# Patient Record
Sex: FEMALE | URBAN - METROPOLITAN AREA
[De-identification: names, ages, dates, MRNs, and addresses within clinical notes are randomized per-mention and may not be internally consistent; named-entity substitution may affect disease eponyms.]

---

## 2023-09-22 ENCOUNTER — TELEPHONE ENCOUNTER (OUTPATIENT)
Dept: URBAN - METROPOLITAN AREA CLINIC 6 | Facility: CLINIC | Age: 58
End: 2023-09-22

## 2023-09-22 ENCOUNTER — TELEPHONE ENCOUNTER (OUTPATIENT)
Dept: URBAN - METROPOLITAN AREA CLINIC 98 | Facility: CLINIC | Age: 58
End: 2023-09-22

## 2023-09-28 ENCOUNTER — OFFICE VISIT (OUTPATIENT)
Dept: URBAN - METROPOLITAN AREA CLINIC 98 | Facility: CLINIC | Age: 58
End: 2023-09-28
Payer: OTHER GOVERNMENT

## 2023-09-28 ENCOUNTER — LAB OUTSIDE AN ENCOUNTER (OUTPATIENT)
Dept: URBAN - METROPOLITAN AREA CLINIC 98 | Facility: CLINIC | Age: 58
End: 2023-09-28

## 2023-09-28 VITALS
BODY MASS INDEX: 22.16 KG/M2 | HEIGHT: 65 IN | DIASTOLIC BLOOD PRESSURE: 94 MMHG | WEIGHT: 133 LBS | SYSTOLIC BLOOD PRESSURE: 137 MMHG | HEART RATE: 91 BPM | TEMPERATURE: 97.3 F

## 2023-09-28 DIAGNOSIS — R19.4 CHANGE IN BOWEL HABIT: ICD-10-CM

## 2023-09-28 DIAGNOSIS — Z12.11 SCREENING FOR COLON CANCER: ICD-10-CM

## 2023-09-28 DIAGNOSIS — K92.1 HEMATOCHEZIA: ICD-10-CM

## 2023-09-28 PROCEDURE — 99204 OFFICE O/P NEW MOD 45 MIN: CPT | Performed by: INTERNAL MEDICINE

## 2023-09-28 RX ORDER — LOSARTAN POTASSIUM 50 MG/1
1 TABLET TABLET ORAL ONCE A DAY
Status: ACTIVE | COMMUNITY

## 2023-09-28 RX ORDER — SODIUM, POTASSIUM,MAG SULFATES 17.5-3.13G
177ML SOLUTION, RECONSTITUTED, ORAL ORAL ONCE
Qty: 177 ML | Refills: 0 | OUTPATIENT
Start: 2023-09-28 | End: 2023-09-29

## 2023-09-28 NOTE — HPI-TODAY'S VISIT:
Ms. Mancilla is a 59 yo F presenting for new patient visit for rectal bleeding.  Last week she woke up in the middle of the night and had a BM with blood mixed into stool and in toilet bowl.  Blood was bright red. It happened another time the next day twice with less and less blood. Then self-resolved.   She has solid bowel movements at least 3 times per week.  In the last 3 months she has had looser stools with more frequency. Occasionally having watery stools a few times per day.  No furhter bleeding.   Prior colonoscopy: 0  Family history of colon cancer or advanced polyps: 0  Abdominal surgeries: bowel obstruction in June 2022- unsure what was removed during surgery

## 2023-09-28 NOTE — EXAM-FUNCTIONAL ASSESSMENT
Constitutional: well-developed, normal communication ability.   Eyes: Conjunctivae and eyelids appear normal, no scleral icterus. Respiratory:  symmetric expansion of chest wall, normal work of breathing   Gastrointestinal:  normoactive bowel sounds, soft, no tenderness, no rebound tenderness, no shifting dullness, no organomegaly, Rectal exam- external non-bleeding hemorrhoids, no blood in the stool, normal maneuvers. Chaperone present.   Musculoskeletal: normal gait and station, no tenderness present.   Skin: No jaundice   Neurologic: Oriented to person, place, time. Short term memory intact.    Psychiatric: Normal mood and appropriate affect.

## 2023-10-06 ENCOUNTER — LAB OUTSIDE AN ENCOUNTER (OUTPATIENT)
Dept: URBAN - METROPOLITAN AREA CLINIC 98 | Facility: CLINIC | Age: 58
End: 2023-10-06

## 2023-10-06 LAB
C-REACTIVE PROTEIN, QUANT: 0.6
HEMATOCRIT: 39.7
HEMOGLOBIN: 12.8
IMMUNOGLOBULIN A: 226
INTERPRETATION: (no result)
MCH: 28.2
MCHC: 32.2
MCV: 87.4
MPV: 12
PLATELET COUNT: 270
RDW: 12
RED BLOOD CELL COUNT: 4.54
TISSUE TRANSGLUTAMINASE AB, IGA: <1
WHITE BLOOD CELL COUNT: 6.2

## 2023-10-13 LAB
CALPROTECTIN, FECAL: 8
CLOSTRIDIUM DIFFICILE TOXINB,QL REAL TIME PCR: NOT DETECTED
CLOSTRIDIUM DIFFICILE: (no result)
PANCREATIC ELASTASE, FECAL: >500
SALMONELLA AND SHIGELLA, CULTURE: (no result)
SHIGA TOXINS, EIA W/RFL TO E.COLI O157 CULTURE: (no result)

## 2023-10-20 ENCOUNTER — CLAIMS CREATED FROM THE CLAIM WINDOW (OUTPATIENT)
Dept: URBAN - METROPOLITAN AREA CLINIC 4 | Facility: CLINIC | Age: 58
End: 2023-10-20
Payer: OTHER GOVERNMENT

## 2023-10-20 ENCOUNTER — OFFICE VISIT (OUTPATIENT)
Dept: URBAN - METROPOLITAN AREA SURGERY CENTER 18 | Facility: SURGERY CENTER | Age: 58
End: 2023-10-20
Payer: OTHER GOVERNMENT

## 2023-10-20 DIAGNOSIS — Z98.0 INTESTINAL ANASTOMOSIS PRESENT: ICD-10-CM

## 2023-10-20 DIAGNOSIS — K64.8 HEMORRHOIDS, INTERNAL. NON-BLEEDING: ICD-10-CM

## 2023-10-20 DIAGNOSIS — Z12.11 COLON CANCER SCREENING: ICD-10-CM

## 2023-10-20 DIAGNOSIS — D12.0 ADENOMATOUS POLYP OF CECUM: ICD-10-CM

## 2023-10-20 DIAGNOSIS — D12.0 BENIGN NEOPLASM OF CECUM: ICD-10-CM

## 2023-10-20 DIAGNOSIS — Z12.11 COLON CANCER SCREENING (HIGH RISK): ICD-10-CM

## 2023-10-20 DIAGNOSIS — D12.0 ADENOMA OF CECUM: ICD-10-CM

## 2023-10-20 DIAGNOSIS — Z98.0 H/O BILLROTH II OPERATION: ICD-10-CM

## 2023-10-20 DIAGNOSIS — D12.5 ADENOMA OF SIGMOID COLON: ICD-10-CM

## 2023-10-20 PROCEDURE — G8907 PT DOC NO EVENTS ON DISCHARG: HCPCS | Performed by: INTERNAL MEDICINE

## 2023-10-20 PROCEDURE — 45380 COLONOSCOPY AND BIOPSY: CPT | Performed by: INTERNAL MEDICINE

## 2023-10-20 PROCEDURE — 45385 COLONOSCOPY W/LESION REMOVAL: CPT | Performed by: INTERNAL MEDICINE

## 2023-10-20 PROCEDURE — 88305 TISSUE EXAM BY PATHOLOGIST: CPT | Performed by: PATHOLOGY

## 2023-10-20 PROCEDURE — 00811 ANES LWR INTST NDSC NOS: CPT | Performed by: NURSE ANESTHETIST, CERTIFIED REGISTERED

## 2023-10-20 RX ORDER — LOSARTAN POTASSIUM 50 MG/1
1 TABLET TABLET ORAL ONCE A DAY
Status: ACTIVE | COMMUNITY

## 2023-11-17 ENCOUNTER — OFFICE VISIT (OUTPATIENT)
Dept: URBAN - METROPOLITAN AREA CLINIC 98 | Facility: CLINIC | Age: 58
End: 2023-11-17
Payer: OTHER GOVERNMENT

## 2023-11-17 ENCOUNTER — DASHBOARD ENCOUNTERS (OUTPATIENT)
Age: 58
End: 2023-11-17

## 2023-11-17 VITALS
HEART RATE: 81 BPM | WEIGHT: 135.8 LBS | HEIGHT: 65 IN | SYSTOLIC BLOOD PRESSURE: 135 MMHG | DIASTOLIC BLOOD PRESSURE: 83 MMHG | TEMPERATURE: 97.1 F | BODY MASS INDEX: 22.63 KG/M2

## 2023-11-17 DIAGNOSIS — K92.1 HEMATOCHEZIA: ICD-10-CM

## 2023-11-17 DIAGNOSIS — R19.4 CHANGE IN BOWEL HABIT: ICD-10-CM

## 2023-11-17 PROCEDURE — 99214 OFFICE O/P EST MOD 30 MIN: CPT | Performed by: INTERNAL MEDICINE

## 2023-11-17 RX ORDER — LOSARTAN POTASSIUM 50 MG/1
1 TABLET TABLET ORAL ONCE A DAY
Status: ACTIVE | COMMUNITY

## 2023-11-17 NOTE — HPI-TODAY'S VISIT:
Ms. Mancilla is a 59 yo F presenting for followup  patient visit for rectal bleeding. Last visit 9/28/23.  She has not had any rectal bleeding since before colonoscopy.  She is having 2-3 soft stools per day, not having watery stools at this time. She is having some solid stools.  No abdominal pain.  No unintentional weight loss.    I reviwed:  10/6/23 stool testing: normal 10/6/23 CRP: normal 10/6/23 fecal calprotectin and elastase: normal 10/20/23 colonoscopy: 2 polyps removed, internal hemorrhoids 10/20/23 colon path: tubular adenomas, next due in 2028